# Patient Record
(demographics unavailable — no encounter records)

---

## 2017-02-02 NOTE — NM
Nuclear Medicine Whole Body Bone Scan



Clinical History: 51-year-old female with a history of breast cancer in 2013, and low back pain. Eval
uate for metastatic disease.



ICD-10 Diagnostic Code: C50.112.



Radiopharmaceutical: 21.3 mCi of IV technetium 99m MDP.



Technique: Approximately 3-4 hours after the uncomplicated intravenous administration of radiopharmac
eutical, anterior and posterior planar images of the axial and appendicular skeleton were obtained.



Comparison Studies: CT imaging of the chest, abdomen, and pelvis performed earlier today at [facility], and
 MR imaging of the lumbar spine dated October 10, 2016.



Findings: There is degenerative-type uptake associated with the posterior left lateral cervical spine
, the right acromioclavicular joint, posteriorly at the expected L4-L5 facets, involving the right kn
ee, and the distal portion of the left great toe. There is physiologic uptake within the renal collec
ting systems and the urinary bladder. There is no scintigraphic evidence of osseous metastases.



Impression:



1. There is no scintigraphic evidence of osseous metastatic disease.

2. 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
ammatory facet osteoarthropathy. Given the patient's history of low back pain, would facet anesthetic
 injections be considered for therapy?